# Patient Record
Sex: MALE
[De-identification: names, ages, dates, MRNs, and addresses within clinical notes are randomized per-mention and may not be internally consistent; named-entity substitution may affect disease eponyms.]

---
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
## 2025-01-16 ENCOUNTER — NEW PATIENT (OUTPATIENT)
Age: 20
End: 2025-01-16

## 2025-01-16 DIAGNOSIS — H52.13: ICD-10-CM

## 2025-01-16 PROCEDURE — 92015 DETERMINE REFRACTIVE STATE: CPT

## 2025-01-16 PROCEDURE — 92310-2 LEVEL 2 SOFT LENS UPDATE

## 2025-01-16 PROCEDURE — 92004 COMPRE OPH EXAM NEW PT 1/>: CPT
